# Patient Record
Sex: FEMALE
[De-identification: names, ages, dates, MRNs, and addresses within clinical notes are randomized per-mention and may not be internally consistent; named-entity substitution may affect disease eponyms.]

---
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
## 2022-01-23 ENCOUNTER — OFFICE VISIT (OUTPATIENT)
Dept: URGENT CARE | Facility: URGENT CARE | Age: 22
End: 2022-01-23
Payer: COMMERCIAL

## 2022-01-23 VITALS
RESPIRATION RATE: 18 BRPM | TEMPERATURE: 98.4 F | DIASTOLIC BLOOD PRESSURE: 84 MMHG | HEART RATE: 84 BPM | BODY MASS INDEX: 23.83 KG/M2 | OXYGEN SATURATION: 99 % | SYSTOLIC BLOOD PRESSURE: 130 MMHG | WEIGHT: 134.48 LBS | HEIGHT: 63 IN

## 2022-01-23 DIAGNOSIS — H65.01 NON-RECURRENT ACUTE SEROUS OTITIS MEDIA OF RIGHT EAR: Primary | ICD-10-CM

## 2022-01-23 PROCEDURE — 99213 OFFICE O/P EST LOW 20 MIN: CPT | Performed by: PHYSICIAN ASSISTANT

## 2022-01-23 RX ORDER — PSEUDOEPHEDRINE HCL 30 MG
30 TABLET ORAL EVERY 4 HOURS PRN
Qty: 30 TABLET | Refills: 0 | Status: SHIPPED | OUTPATIENT
Start: 2022-01-23 | End: 2022-02-02

## 2022-01-23 RX ORDER — AZITHROMYCIN 250 MG/1
TABLET, FILM COATED ORAL
Qty: 6 TABLET | Refills: 0 | Status: SHIPPED | OUTPATIENT
Start: 2022-01-23 | End: 2022-02-09 | Stop reason: ALTCHOICE

## 2022-01-23 ASSESSMENT — PAIN SCALES - GENERAL: PAINLEVEL: 4

## 2022-01-24 NOTE — PROGRESS NOTES
"Urgent Care Visit Note    Subjective   Chief Complaint  Chief Complaint   Patient presents with   • Ear Problem     C/o ear infection to right ear       History of Present Illness  Ekaterina Martinez is a 21 y.o. female presenting for complaints of right ear pain.  She notes that she has had an upper respiratory infection for the past couple of weeks and that today she noticed that her right ear was hurting.  She notes pain is constant and sharp in nature.  She denies any left ear pain.  She denies sore throat or fevers.    Review of Systems  Pertinent positives and negatives as per the HPI    History reviewed. No pertinent past medical history.      Current Outpatient Medications:   •  norgestimate-ethinyl estradioL (ORTHO-CYCLEN) 0.25-35 mg-mcg per tablet, Take 1 tablet by mouth daily, Disp: 28 tablet, Rfl: 3  •  pseudoephedrine (Sudafed) 30 mg tablet, Take 1 tablet (30 mg total) by mouth every 4 (four) hours as needed for congestion for up to 10 days, Disp: 30 tablet, Rfl: 0  •  azithromycin (ZITHROMAX) 250 mg tablet, Take 500 mg (2 tablets) PO the first day, then 250 mg (1 tablet) PO daily for 4 days., Disp: 6 tablet, Rfl: 0    Objective   Vital Signs  /84 (BP Location: Right arm, Patient Position: Sitting)   Pulse 84   Temp 36.9 °C (98.4 °F) (Temporal)   Resp 18   Ht 1.6 m (5' 3\")   Wt 61 kg   SpO2 99%   BMI 23.82 kg/m²     Physical Exam  Constitutional:       General: She is not in acute distress.     Appearance: Normal appearance.   HENT:      Head: Normocephalic and atraumatic.      Left Ear: Tympanic membrane and ear canal normal.      Ears:      Comments: Right ear with normal canal.  Bulging and erythema of tympanic membrane     Nose: Nose normal. No congestion or rhinorrhea.      Mouth/Throat:      Mouth: Mucous membranes are moist.      Pharynx: No oropharyngeal exudate.   Eyes:      Extraocular Movements: Extraocular movements intact.      Pupils: Pupils are equal, round, and reactive to " light.   Cardiovascular:      Rate and Rhythm: Normal rate and regular rhythm.      Heart sounds: Normal heart sounds. No murmur heard.  Pulmonary:      Effort: Pulmonary effort is normal. No respiratory distress.      Breath sounds: Normal breath sounds. No wheezing or rhonchi.   Abdominal:      General: Abdomen is flat. Bowel sounds are normal. There is no distension.      Palpations: Abdomen is soft.      Tenderness: There is no abdominal tenderness.   Musculoskeletal:         General: No deformity. Normal range of motion.      Cervical back: Normal range of motion and neck supple. No rigidity.   Skin:     General: Skin is warm and dry.      Capillary Refill: Capillary refill takes less than 2 seconds.      Findings: No lesion.   Neurological:      General: No focal deficit present.      Mental Status: She is alert and oriented to person, place, and time.   Psychiatric:         Mood and Affect: Mood normal.         Lab Review  No results found for this or any previous visit (from the past 12 hour(s)).    Radiology Review  No results found.    Medical Decision Making  Signs and symptoms consistent with otitis media.  Prescription sent for Sudafed as well as azithromycin to the pharmacy.  Azithromycin was chosen as she has a penicillin allergy    Assessment/Plan   See discharge instructions    Patient Education: Ready to learn, no apparent learning barriers were identified; learning preference includes listening.  Explained diagnosis and treatment plan; patient/caregiver expressed understanding of the content. All questions answered.     Patient Instructions   1.  Otitis media, (inner ear infection) right side  - Begin Sudafed as prescribed  - Begin azithromycin as prescribed and finish entire course  - Take Tylenol and ibuprofen as directed for pain and fever  - Monitor for worsening signs and symptoms and return as needed  Patient Education     Otitis Media, Adult    Otitis media is a condition in which the  middle ear is red and swollen (inflamed) and full of fluid. The middle ear is the part of the ear that contains bones for hearing as well as air that helps send sounds to the brain. The condition usually goes away on its own.  What are the causes?  This condition is caused by a blockage in the eustachian tube. The eustachian tube connects the middle ear to the back of the nose. It normally allows air into the middle ear. The blockage is caused by fluid or swelling. Problems that can cause blockage include:  · A cold or infection that affects the nose, mouth, or throat.  · Allergies.  · An irritant, such as tobacco smoke.  · Adenoids that have become large. The adenoids are soft tissue located in the back of the throat, behind the nose and the roof of the mouth.  · Growth or swelling in the upper part of the throat, just behind the nose (nasopharynx).  · Damage to the ear caused by change in pressure. This is called barotrauma.  What are the signs or symptoms?  Symptoms of this condition include:  · Ear pain.  · Fever.  · Problems with hearing.  · Being tired.  · Fluid leaking from the ear.  · Ringing in the ear.  How is this treated?  This condition can go away on its own within 3-5 days. But if the condition is caused by bacteria or does not go away on its own, or if it keeps coming back, your doctor may:  · Give you antibiotic medicines.  · Give you medicines for pain.  Follow these instructions at home:  · Take over-the-counter and prescription medicines only as told by your doctor.  · If you were prescribed an antibiotic medicine, take it as told by your doctor. Do not stop taking the antibiotic even if you start to feel better.  · Keep all follow-up visits as told by your doctor. This is important.  Contact a doctor if:  · You have bleeding from your nose.  · There is a lump on your neck.  · You are not feeling better in 5 days.  · You feel worse instead of better.  Get help right away if:  · You have pain that  is not helped with medicine.  · You have swelling, redness, or pain around your ear.  · You get a stiff neck.  · You cannot move part of your face (paralysis).  · You notice that the bone behind your ear hurts when you touch it.  · You get a very bad headache.  Summary  · Otitis media means that the middle ear is red, swollen, and full of fluid.  · This condition usually goes away on its own.  · If the problem does not go away, treatment may be needed. You may be given medicines to treat the infection or to treat your pain.  · If you were prescribed an antibiotic medicine, take it as told by your doctor. Do not stop taking the antibiotic even if you start to feel better.  · Keep all follow-up visits as told by your doctor. This is important.  This information is not intended to replace advice given to you by your health care provider. Make sure you discuss any questions you have with your health care provider.  Document Revised: 11/19/2020 Document Reviewed: 11/19/2020  [employer] Patient Education © 2021 [employer]           RIMMA Hartley  1/23/2022

## 2022-01-24 NOTE — PATIENT INSTRUCTIONS
1.  Otitis media, (inner ear infection) right side  - Begin Sudafed as prescribed  - Begin azithromycin as prescribed and finish entire course  - Take Tylenol and ibuprofen as directed for pain and fever  - Monitor for worsening signs and symptoms and return as needed  Patient Education     Otitis Media, Adult    Otitis media is a condition in which the middle ear is red and swollen (inflamed) and full of fluid. The middle ear is the part of the ear that contains bones for hearing as well as air that helps send sounds to the brain. The condition usually goes away on its own.  What are the causes?  This condition is caused by a blockage in the eustachian tube. The eustachian tube connects the middle ear to the back of the nose. It normally allows air into the middle ear. The blockage is caused by fluid or swelling. Problems that can cause blockage include:  · A cold or infection that affects the nose, mouth, or throat.  · Allergies.  · An irritant, such as tobacco smoke.  · Adenoids that have become large. The adenoids are soft tissue located in the back of the throat, behind the nose and the roof of the mouth.  · Growth or swelling in the upper part of the throat, just behind the nose (nasopharynx).  · Damage to the ear caused by change in pressure. This is called barotrauma.  What are the signs or symptoms?  Symptoms of this condition include:  · Ear pain.  · Fever.  · Problems with hearing.  · Being tired.  · Fluid leaking from the ear.  · Ringing in the ear.  How is this treated?  This condition can go away on its own within 3-5 days. But if the condition is caused by bacteria or does not go away on its own, or if it keeps coming back, your doctor may:  · Give you antibiotic medicines.  · Give you medicines for pain.  Follow these instructions at home:  · Take over-the-counter and prescription medicines only as told by your doctor.  · If you were prescribed an antibiotic medicine, take it as told by your doctor.  Do not stop taking the antibiotic even if you start to feel better.  · Keep all follow-up visits as told by your doctor. This is important.  Contact a doctor if:  · You have bleeding from your nose.  · There is a lump on your neck.  · You are not feeling better in 5 days.  · You feel worse instead of better.  Get help right away if:  · You have pain that is not helped with medicine.  · You have swelling, redness, or pain around your ear.  · You get a stiff neck.  · You cannot move part of your face (paralysis).  · You notice that the bone behind your ear hurts when you touch it.  · You get a very bad headache.  Summary  · Otitis media means that the middle ear is red, swollen, and full of fluid.  · This condition usually goes away on its own.  · If the problem does not go away, treatment may be needed. You may be given medicines to treat the infection or to treat your pain.  · If you were prescribed an antibiotic medicine, take it as told by your doctor. Do not stop taking the antibiotic even if you start to feel better.  · Keep all follow-up visits as told by your doctor. This is important.  This information is not intended to replace advice given to you by your health care provider. Make sure you discuss any questions you have with your health care provider.  Document Revised: 11/19/2020 Document Reviewed: 11/19/2020  [employer] Patient Education © 2021 [employer]

## 2022-02-09 ENCOUNTER — OFFICE VISIT (OUTPATIENT)
Dept: FAMILY MEDICINE | Facility: CLINIC | Age: 22
End: 2022-02-09
Payer: COMMERCIAL

## 2022-02-09 VITALS
WEIGHT: 129 LBS | SYSTOLIC BLOOD PRESSURE: 102 MMHG | TEMPERATURE: 97.3 F | BODY MASS INDEX: 22.85 KG/M2 | OXYGEN SATURATION: 99 % | RESPIRATION RATE: 18 BRPM | HEART RATE: 94 BPM | DIASTOLIC BLOOD PRESSURE: 58 MMHG

## 2022-02-09 DIAGNOSIS — F41.9 ANXIETY: Primary | ICD-10-CM

## 2022-02-09 DIAGNOSIS — H92.01 EARACHE ON RIGHT: ICD-10-CM

## 2022-02-09 PROCEDURE — 99213 OFFICE O/P EST LOW 20 MIN: CPT | Performed by: PHYSICIAN ASSISTANT

## 2022-02-09 RX ORDER — FLUOXETINE 10 MG/1
10 CAPSULE ORAL DAILY
Qty: 45 CAPSULE | Refills: 0 | Status: SHIPPED | OUTPATIENT
Start: 2022-02-09 | End: 2022-03-16 | Stop reason: ALTCHOICE

## 2022-02-09 ASSESSMENT — PAIN SCALES - GENERAL: PAINLEVEL: 0-NO PAIN

## 2022-02-09 NOTE — PROGRESS NOTES
Subjective      Ekaterina Martinez is a 21 y.o. female who presents for right ear concerns and anxiety. Patient explains that she was on antibiotics for a right ear infection. She started the antibiotic approximately two weeks ago. She missed the last two doses as she left them in [place] when she came here. Reports that she still has some pain intermittently and feels like her lymph nodes on the right are swollen. Denies fever, chills, drainage from ear, muffled hearing, popping or clicking noises, sore throat, cough.     She is also concerned with anxiety. She does have a guardian present with her who also contributes to history. Patient has been dealing with anxiety for years. Guardian reports that the patient seems to have significant mood swing the week before menstruation that affects her daily life. Guardian reports that patient will go to her room and hardly come out. She will also lay around more and seems to be very overwhelmed. After patient starts her period things seems to be ok for about 3 weeks. Symptoms affect school, work and social life. Patient states that it does feel like her heart is beating out of her chest at times and she feels like she cannot shut off her mind sometimes. She denies headaches, dizziness, lightheadedness, chest pain, shortness of breath, nausea, vomiting, diarrhea. Denies suicidal or homicidal ideations. She has never been on birth control before and does not wish to take this. States that her sister was on birth control previously and this seemed to make her anxiety worse.       The following have been reviewed and updated as appropriate in this visit:  Past Medical, Surgical and Social History    Allergies   Allergen Reactions   • Clavulanic Acid      NAUSEA AND VOMITING   • Penicillins    • Amoxicillin Hives     NAUSEA AND VOMITING   • Sulfa (Sulfonamide Antibiotics) Rash     Current Outpatient Medications   Medication Sig Dispense Refill   • FLUoxetine (PROzac) 10 mg  capsule Take 1 capsule (10 mg total) by mouth daily 45 capsule 0     No current facility-administered medications for this visit.       Review of Systems  As noted above in HPI.    Objective   /58 (BP Location: Left arm, Patient Position: Sitting, Cuff Size: Regular Adult)   Pulse 94   Temp 36.3 °C (97.3 °F) (Temporal)   Resp 18   Wt 58.5 kg (129 lb)   LMP  (LMP Unknown)   SpO2 99%   Breastfeeding No   BMI 22.85 kg/m²     Physical Exam  Vitals and nursing note reviewed.   Constitutional:       General: She is not in acute distress.     Appearance: Normal appearance.      Comments: Well groomed, pleasant, cooperative.   HENT:      Head: Normocephalic.      Right Ear: Tympanic membrane, ear canal and external ear normal.      Left Ear: There is impacted cerumen.      Mouth/Throat:      Mouth: Mucous membranes are moist.      Pharynx: No posterior oropharyngeal erythema.   Eyes:      Extraocular Movements: Extraocular movements intact.      Conjunctiva/sclera: Conjunctivae normal.   Cardiovascular:      Rate and Rhythm: Normal rate and regular rhythm.      Heart sounds: Normal heart sounds.   Pulmonary:      Effort: Pulmonary effort is normal.      Breath sounds: Normal breath sounds. No wheezing, rhonchi or rales.   Musculoskeletal:      Cervical back: Normal range of motion and neck supple.   Lymphadenopathy:      Cervical: No cervical adenopathy.   Skin:     General: Skin is warm and dry.   Neurological:      Mental Status: She is alert and oriented to person, place, and time.   Psychiatric:         Attention and Perception: Attention normal.         Mood and Affect: Mood normal.         Speech: Speech normal.         Behavior: Behavior normal.         Thought Content: Thought content normal. Thought content does not include homicidal or suicidal ideation.         Judgment: Judgment normal.         LABS  No results found for this or any previous visit (from the past 2 hour(s)).      Assessment/Plan    Diagnoses and all orders for this visit:    Anxiety  -     FLUoxetine (PROzac) 10 mg capsule; Take 1 capsule (10 mg total) by mouth daily    Earache on right        Reassured the patient that there was no evidence of infection in the right ear on exam today. Likely she has some residual fluid from recent otitis media that should go resolve with time. She may try mucinex and anti-inflammatories as needed for symptoms.     Patient scored 19 on PHQ-9 and 12 on MOLLY-7 indicating scores of moderately severe and moderate respectively. Discussed results with patient. We did discuss starting oral contraceptive versus antidepressant. Patient does not wish to start oral contraceptive and would prefer to try SSRI. Fluoxetine was prescribed. We did start at the lower dose of 10 mg. Discussed that it will take 4-6 weeks for full effects. I would like to have her follow up between 4 & 6 weeks for recheck to determine if she is tolerating the medication and if the dose needs to be adjusted. If any concerns arise sooner recommend contacting the clinic.   Patient expresses understanding and agrees with plan of care.     RIMMA Camacho  February 9, 2022 10:30 AM

## 2022-02-19 ENCOUNTER — OFFICE VISIT (OUTPATIENT)
Dept: URGENT CARE | Facility: URGENT CARE | Age: 22
End: 2022-02-19
Payer: COMMERCIAL

## 2022-02-19 VITALS
DIASTOLIC BLOOD PRESSURE: 73 MMHG | SYSTOLIC BLOOD PRESSURE: 115 MMHG | RESPIRATION RATE: 16 BRPM | WEIGHT: 128.97 LBS | HEART RATE: 74 BPM | OXYGEN SATURATION: 96 % | TEMPERATURE: 98.5 F | BODY MASS INDEX: 22.85 KG/M2

## 2022-02-19 DIAGNOSIS — R07.0 THROAT PAIN: Primary | ICD-10-CM

## 2022-02-19 DIAGNOSIS — I88.9 LYMPHADENITIS: ICD-10-CM

## 2022-02-19 LAB
GP B STREP AG SPEC QL: NEGATIVE
HETEROPH AB SER-ACNC: NEGATIVE

## 2022-02-19 PROCEDURE — 99213 OFFICE O/P EST LOW 20 MIN: CPT | Performed by: FAMILY MEDICINE

## 2022-02-19 PROCEDURE — 86308 HETEROPHILE ANTIBODY SCREEN: CPT | Performed by: FAMILY MEDICINE

## 2022-02-19 PROCEDURE — 36415 COLL VENOUS BLD VENIPUNCTURE: CPT | Performed by: FAMILY MEDICINE

## 2022-02-19 PROCEDURE — 87880 STREP A ASSAY W/OPTIC: CPT | Mod: QW | Performed by: FAMILY MEDICINE

## 2022-02-19 PROCEDURE — 87070 CULTURE OTHR SPECIMN AEROBIC: CPT | Performed by: FAMILY MEDICINE

## 2022-02-19 RX ORDER — CEPHALEXIN 500 MG/1
500 CAPSULE ORAL 4 TIMES DAILY
Qty: 28 CAPSULE | Refills: 0 | Status: SHIPPED | OUTPATIENT
Start: 2022-02-19 | End: 2022-02-26

## 2022-02-19 ASSESSMENT — ENCOUNTER SYMPTOMS
CHILLS: 0
PALPITATIONS: 0
SHORTNESS OF BREATH: 0
EYE PAIN: 0
FEVER: 0
COLOR CHANGE: 0
VOMITING: 0
ARTHRALGIAS: 0
COUGH: 0
SEIZURES: 0
ABDOMINAL PAIN: 0
SORE THROAT: 0
DYSURIA: 0
HEMATURIA: 0
BACK PAIN: 0

## 2022-02-19 ASSESSMENT — PAIN SCALES - GENERAL: PAINLEVEL: 6

## 2022-02-19 NOTE — PROGRESS NOTES
Jaspal Martinez is a 21 y.o. female who presents for C/o sore throat and right ear pain, for the last week or 2, very tired, no muffled hearing    HPI    The following have been reviewed and updated as appropriate in this visit:          Allergies   Allergen Reactions   • Clavulanic Acid      NAUSEA AND VOMITING   • Penicillins    • Amoxicillin Hives     NAUSEA AND VOMITING   • Sulfa (Sulfonamide Antibiotics) Rash     Current Outpatient Medications   Medication Sig Dispense Refill   • FLUoxetine (PROzac) 10 mg capsule Take 1 capsule (10 mg total) by mouth daily 45 capsule 0   • cephalexin (KEFLEX) 500 mg capsule Take 1 capsule (500 mg total) by mouth 4 (four) times a day for 7 days 28 capsule 0     No current facility-administered medications for this visit.     History reviewed. No pertinent past medical history.  Past Surgical History:   Procedure Laterality Date   • TONSILLECTOMY AND ADENOIDECTOMY       Family History   Family history unknown: Yes     Social History     Socioeconomic History   • Marital status: Single   Tobacco Use   • Smoking status: Never Smoker   • Smokeless tobacco: Never Used   Substance and Sexual Activity   • Alcohol use: Never   • Drug use: Never   • Sexual activity: Defer     Social Determinants of Health     Tobacco Use: Low Risk    • Smoking Tobacco Use: Never Smoker   • Smokeless Tobacco Use: Never Used   Depression: At risk   • PHQ-2 Score: 19       Review of Systems   Constitutional: Negative for chills and fever.   HENT: Negative for ear pain and sore throat.    Eyes: Negative for pain and visual disturbance.   Respiratory: Negative for cough and shortness of breath.    Cardiovascular: Negative for chest pain and palpitations.   Gastrointestinal: Negative for abdominal pain and vomiting.   Genitourinary: Negative for dysuria and hematuria.   Musculoskeletal: Negative for arthralgias and back pain.   Skin: Negative for color change and rash.   Neurological: Negative  for seizures and syncope.   All other systems reviewed and are negative.      Objective   /73 (BP Location: Right arm, Patient Position: Sitting, Cuff Size: Regular Adult)   Pulse 74   Temp 36.9 °C (98.5 °F) (Temporal)   Resp 16   Wt 58.5 kg   LMP  (LMP Unknown)   SpO2 96%   BMI 22.85 kg/m²     Physical Exam  Vitals and nursing note reviewed.   Constitutional:       General: She is not in acute distress.     Appearance: Normal appearance.   HENT:      Head: Normocephalic.      Right Ear: Tympanic membrane, ear canal and external ear normal.      Left Ear: There is no impacted cerumen.      Mouth/Throat:      Mouth: Mucous membranes are moist.      Pharynx: No posterior oropharyngeal erythema.   Eyes:      Extraocular Movements: Extraocular movements intact.      Conjunctiva/sclera: Conjunctivae normal.   Cardiovascular:      Rate and Rhythm: Normal rate and regular rhythm.      Heart sounds: Normal heart sounds.   Pulmonary:      Effort: Pulmonary effort is normal.      Breath sounds: Normal breath sounds. No wheezing, rhonchi or rales.   Musculoskeletal:      Cervical back: Normal range of motion and neck supple.   Lymphadenopathy:      Cervical: Cervical adenopathy present.   Skin:     General: Skin is warm and dry.   Neurological:      Mental Status: She is alert and oriented to person, place, and time.   Psychiatric:         Attention and Perception: Attention normal.         Mood and Affect: Mood normal.         Speech: Speech normal.         Behavior: Behavior normal.         Thought Content: Thought content normal. Thought content does not include homicidal or suicidal ideation.         Judgment: Judgment normal.         Assessment/Plan   Diagnoses and all orders for this visit:    Throat pain  -     Rapid Strep Screen Swab  -     Mononucleosis screen Blood, Venous  -     Throat culture    Lymphadenitis  -     cephalexin (KEFLEX) 500 mg capsule; Take 1 capsule (500 mg total) by mouth 4 (four)  times a day for 7 days    She has tenderness along her right anterior cervical chain.  She be treated for lymphadenitis.  Cephalexin is ordered because of her penicillin allergy.Follow-up in 5 to 7 days if her symptoms not resolved.    Wan Norton MD

## 2022-02-21 LAB — BACTERIA THROAT CULT: NORMAL

## 2022-03-16 ENCOUNTER — OFFICE VISIT (OUTPATIENT)
Dept: FAMILY MEDICINE | Facility: CLINIC | Age: 22
End: 2022-03-16
Payer: COMMERCIAL

## 2022-03-16 VITALS
DIASTOLIC BLOOD PRESSURE: 54 MMHG | OXYGEN SATURATION: 98 % | BODY MASS INDEX: 21.93 KG/M2 | WEIGHT: 123.8 LBS | SYSTOLIC BLOOD PRESSURE: 112 MMHG | HEART RATE: 84 BPM | TEMPERATURE: 97.1 F | RESPIRATION RATE: 18 BRPM

## 2022-03-16 DIAGNOSIS — F41.9 ANXIETY: Primary | ICD-10-CM

## 2022-03-16 PROCEDURE — 99213 OFFICE O/P EST LOW 20 MIN: CPT | Performed by: PHYSICIAN ASSISTANT

## 2022-03-16 RX ORDER — FLUOXETINE HYDROCHLORIDE 20 MG/1
20 CAPSULE ORAL DAILY
Qty: 45 CAPSULE | Refills: 0 | Status: SHIPPED | OUTPATIENT
Start: 2022-03-16 | End: 2022-04-20 | Stop reason: ALTCHOICE

## 2022-03-16 ASSESSMENT — PAIN SCALES - GENERAL: PAINLEVEL: 0-NO PAIN

## 2022-03-16 NOTE — PROGRESS NOTES
Subjective      Ekaterina Martinez is a 21 y.o. female who presents for follow up of anxiety and depression. Patient was started on Prozac over one month ago. She states that she feels there has been some improvement, however the week after her period she feels she still has severe symptoms. Patient reports that she burst into tears at work for no real reason once the week after her period. She did call out from work two days ago as she still wasn't feeling up to going to work. She does still feel she has trouble controlling her worrying and worries about numerous things. She does feel she has a little interest and pleasure in doing things particularly the week after menstruation. She feels fatigued and has troubles with sleep. Denies homicidal or suicidal ideation. She denies any concerns with side effects with the prozac. Denies headache, dizziness, changes in appetite, nausea, vomiting, diarrhea, abdominal pain, urinary symptoms.       The following have been reviewed and updated as appropriate in this visit:  Past Medical, Surgical and Social History    Allergies   Allergen Reactions   • Clavulanic Acid      NAUSEA AND VOMITING   • Penicillins    • Amoxicillin Hives     NAUSEA AND VOMITING   • Sulfa (Sulfonamide Antibiotics) Rash     Current Outpatient Medications   Medication Sig Dispense Refill   • FLUoxetine (PROzac) 20 mg capsule Take 1 capsule (20 mg total) by mouth daily 45 capsule 0     No current facility-administered medications for this visit.       Review of Systems  As noted above in HPI.    Objective   /54 (BP Location: Left arm, Patient Position: Sitting, Cuff Size: Regular Adult)   Pulse 84   Temp 36.2 °C (97.1 °F) (Temporal)   Resp 18   Wt 56.2 kg (123 lb 12.8 oz)   SpO2 98%   BMI 21.93 kg/m²     Physical Exam  Vitals and nursing note reviewed.   Constitutional:       General: She is not in acute distress.     Appearance: Normal appearance.   HENT:      Head: Normocephalic.    Cardiovascular:      Rate and Rhythm: Normal rate and regular rhythm.      Heart sounds: Normal heart sounds.   Pulmonary:      Effort: Pulmonary effort is normal.      Breath sounds: Normal breath sounds. No wheezing, rhonchi or rales.   Skin:     General: Skin is warm and dry.   Neurological:      Mental Status: She is alert and oriented to person, place, and time.   Psychiatric:         Attention and Perception: Attention normal.         Mood and Affect: Affect normal.         Speech: Speech normal.         Behavior: Behavior is cooperative.         Thought Content: Thought content normal.         Cognition and Memory: Cognition normal.         Judgment: Judgment normal.         LABS  No results found for this or any previous visit (from the past 2 hour(s)).      Assessment/Plan   Diagnoses and all orders for this visit:    Anxiety  -     FLUoxetine (PROzac) 20 mg capsule; Take 1 capsule (20 mg total) by mouth daily        Patient did have improvement in both her PHQ-9 and MOLLY-7 scores.  She does report she is still having some significant symptoms.  We will go ahead and increase her dose of fluoxetine to 20 mg daily.  I would like patient to return to clinic in 6 weeks for reevaluation to determine if there has been any improvements in her symptoms.  She was advised to contact the clinic sooner if she develops any acute concerns regarding medication or with her wellbeing.  Patient expresses understanding and agrees with plan of care.     RIMMA Camacho  March 16, 2022 9:52 AM

## 2022-03-24 ENCOUNTER — OFFICE VISIT (OUTPATIENT)
Dept: FAMILY MEDICINE | Facility: CLINIC | Age: 22
End: 2022-03-24
Payer: COMMERCIAL

## 2022-03-24 ENCOUNTER — APPOINTMENT (OUTPATIENT)
Dept: LAB | Facility: URGENT CARE | Age: 22
End: 2022-03-24
Payer: COMMERCIAL

## 2022-03-24 VITALS
SYSTOLIC BLOOD PRESSURE: 122 MMHG | BODY MASS INDEX: 21.79 KG/M2 | OXYGEN SATURATION: 98 % | RESPIRATION RATE: 18 BRPM | DIASTOLIC BLOOD PRESSURE: 66 MMHG | HEART RATE: 80 BPM | TEMPERATURE: 97.7 F | WEIGHT: 123 LBS

## 2022-03-24 DIAGNOSIS — R30.0 DYSURIA: Primary | ICD-10-CM

## 2022-03-24 LAB
BACTERIA #/AREA URNS AUTO: NORMAL /HPF
BILIRUB UR QL: NEGATIVE
CLARITY UR: CLEAR
COLOR UR: YELLOW
GLUCOSE UR QL: NEGATIVE MG/DL
HGB UR QL: NEGATIVE
KETONES UR-MCNC: NEGATIVE MG/DL
LEUKOCYTE ESTERASE UR QL STRIP: NEGATIVE
NITRITE UR QL: NEGATIVE
PH UR: 6 PH
PROT UR STRIP-MCNC: NEGATIVE MG/DL
RBC #/AREA URNS AUTO: NEGATIVE /HPF
SP GR UR: 1.01 (ref 1–1.03)
SQUAMOUS #/AREA URNS AUTO: NEGATIVE /HPF
UROBILINOGEN UR-MCNC: 0.2 E.U./DL
WBC #/AREA URNS AUTO: NEGATIVE /HPF

## 2022-03-24 PROCEDURE — 99213 OFFICE O/P EST LOW 20 MIN: CPT | Performed by: PHYSICIAN ASSISTANT

## 2022-03-24 PROCEDURE — 87088 URINE BACTERIA CULTURE: CPT | Performed by: PHYSICIAN ASSISTANT

## 2022-03-24 PROCEDURE — 81001 URINALYSIS AUTO W/SCOPE: CPT | Performed by: PHYSICIAN ASSISTANT

## 2022-03-24 ASSESSMENT — PAIN SCALES - GENERAL: PAINLEVEL: 3

## 2022-03-24 NOTE — PROGRESS NOTES
Subjective      HPI    Ekaterina Martinez is a 21 y.o. female who presents for increased urinary frequency and some dysuria.  Symptoms started yesterday.  She has increased her fluids with minimal relief.  She denies any fevers, chills, nausea, vomiting, abd pain, back or flank pain, vaginal symptoms, change in bowel habits. She has no history of UTIs.      Review of Systems    As noted in HPI.      Objective   /66 (BP Location: Right arm, Patient Position: Sitting, Cuff Size: Regular Adult)   Pulse 80   Temp 36.5 °C (97.7 °F) (Temporal)   Resp 18   Wt 55.8 kg (123 lb)   SpO2 98%   BMI 21.79 kg/m²     Physical Exam  Vitals and nursing note reviewed.   Constitutional:       General: She is not in acute distress.     Appearance: Normal appearance.   HENT:      Head: Normocephalic.   Cardiovascular:      Rate and Rhythm: Normal rate and regular rhythm.      Heart sounds: Normal heart sounds.   Pulmonary:      Effort: Pulmonary effort is normal.      Breath sounds: Normal breath sounds. No wheezing, rhonchi or rales.   Abdominal:      General: Bowel sounds are normal.      Palpations: Abdomen is soft.      Tenderness: There is no abdominal tenderness. There is no right CVA tenderness, left CVA tenderness, guarding or rebound.   Skin:     General: Skin is warm and dry.   Neurological:      Mental Status: She is alert and oriented to person, place, and time.         Recent Results (from the past 4 hour(s))   Urinalysis, dipstick Urine, Clean Catch    Collection Time: 03/24/22  2:24 PM   Result Value Ref Range    Color, Urine Yellow Yellow    Clarity, Urine Clear Clear    Specific Gravity, Urine 1.010 1.003 - 1.030    Leukocytes, Urine Negative Negative    Nitrite, Urine Negative Negative    Protein, Urine Negative Negative mg/dL    Ketones, Urine Negative Negative mg/dL    Urobilinogen, Urine 0.2 <2.0 E.U./dL    Bilirubin, Urine Negative Negative    Blood, Urine Negative Negative    Glucose, Urine Negative  Negative mg/dL    pH, Urine 6.0 5.0 - 8.0 PH         Assessment/Plan   Diagnoses and all orders for this visit:    Dysuria  -     Urinalysis, dipstick Urine, Clean Catch  -     Urine culture  -     Urinalysis, microscopic Urine, Clean Catch        Discussion:   Given her symptoms and physical exam, a UA dip was ordered.  UA dip results were discussed with patient as above.  Will culture her urine and call her with results.  If results indicate UTI will call prescription in for the patient.  Patient will continue to increase her fluid intake and use Tylenol, ibuprofen, and Azo as needed for pain.  Expected course of this diagnosis discussed with pt. Pt will f/u in clinic prn persisting or worsening symptoms.  Pt verbalizes understanding and agrees with plan.       RIMMA Camacho

## 2022-03-26 LAB — BACTERIA UR CULT: NORMAL

## 2022-04-20 ENCOUNTER — OFFICE VISIT (OUTPATIENT)
Dept: FAMILY MEDICINE | Facility: CLINIC | Age: 22
End: 2022-04-20
Payer: COMMERCIAL

## 2022-04-20 VITALS
TEMPERATURE: 98.4 F | DIASTOLIC BLOOD PRESSURE: 58 MMHG | HEART RATE: 92 BPM | RESPIRATION RATE: 18 BRPM | WEIGHT: 125.9 LBS | BODY MASS INDEX: 22.3 KG/M2 | OXYGEN SATURATION: 98 % | SYSTOLIC BLOOD PRESSURE: 114 MMHG

## 2022-04-20 DIAGNOSIS — F41.9 ANXIETY: Primary | ICD-10-CM

## 2022-04-20 PROCEDURE — 99213 OFFICE O/P EST LOW 20 MIN: CPT | Performed by: PHYSICIAN ASSISTANT

## 2022-04-20 RX ORDER — FLUOXETINE HYDROCHLORIDE 40 MG/1
40 CAPSULE ORAL DAILY
Qty: 90 CAPSULE | Refills: 3 | Status: SHIPPED | OUTPATIENT
Start: 2022-04-20 | End: 2023-04-20

## 2022-04-20 ASSESSMENT — PAIN SCALES - GENERAL: PAINLEVEL: 0-NO PAIN

## 2022-04-20 NOTE — PROGRESS NOTES
"Subjective      Ekaterina Martinez is a 21 y.o. female who presents for a follow up regarding anxiety. Patient's fluoxetine was increased to 20 mg daily at previous visit. She reports today that she feel that this higher dose is working better. She feels improvement in her anxiety and over all well being. States that she still feels like the \"highs are really high and lows are really low\" however. She denies any side effects from the medication including headache, insomnia, nausea, vomiting, palpitations, abdominal pain, diarrhea, suicidal or homicidal ideation.      The following have been reviewed and updated as appropriate in this visit:  Past Medical, Surgical and Social History    Allergies   Allergen Reactions   • Clavulanic Acid      NAUSEA AND VOMITING   • Penicillins    • Amoxicillin Hives     NAUSEA AND VOMITING   • Sulfa (Sulfonamide Antibiotics) Rash     Current Outpatient Medications   Medication Sig Dispense Refill   • FLUoxetine (PROzac) 40 mg capsule Take 1 capsule (40 mg total) by mouth daily 90 capsule 3     No current facility-administered medications for this visit.       Review of Systems  As noted above in HPI.    Objective   /58 (BP Location: Left arm, Patient Position: Sitting, Cuff Size: Regular Adult)   Pulse 92   Temp 36.9 °C (98.4 °F) (Temporal)   Resp 18   Wt 57.1 kg (125 lb 14.4 oz)   SpO2 98%   BMI 22.30 kg/m²     Physical Exam  Vitals and nursing note reviewed.   Constitutional:       General: She is not in acute distress.     Appearance: Normal appearance.   HENT:      Head: Normocephalic.   Cardiovascular:      Rate and Rhythm: Normal rate and regular rhythm.      Heart sounds: Normal heart sounds.   Pulmonary:      Effort: Pulmonary effort is normal.      Breath sounds: Normal breath sounds.   Skin:     General: Skin is warm and dry.   Neurological:      Mental Status: She is alert and oriented to person, place, and time.         LABS  No results found for this or any " previous visit (from the past 2 hour(s)).      Assessment/Plan   Diagnoses and all orders for this visit:    Anxiety  -     FLUoxetine (PROzac) 40 mg capsule; Take 1 capsule (40 mg total) by mouth daily        Patient has had improvement on her PHQ-9 (14 previously and now 9). MOLLY-7 score remains the same. She is not having any significant side effects from her medication. Discussed staying at current dose versus increasing one more time to see if there may be better results at the higher dose. After discussion, patient would like to proceed with increased dose. New prescription was sent to the pharmacy for the patient. She does still have 10 days worth of 20 mg tabs left. She may take two daily of these to complete that prescription and then start the new prescription thereafter. If this new dose seems to improve her symptoms even more and she is happy with the dose and not having any side effects she may call the clinic and let us know that so we can continue to prescribe this dose. However, if she still feels she is having significant symptoms or she has concerning side effects with the new dose then she should return to clinic in 4-6 weeks for follow up.   Patient expresses understanding and agrees with plan of care.     RIMMA Camacho  April 20, 2022 9:59 AM

## 2022-05-26 ENCOUNTER — CLINICAL SUPPORT (OUTPATIENT)
Dept: FAMILY MEDICINE | Facility: CLINIC | Age: 22
End: 2022-05-26
Payer: COMMERCIAL

## 2022-05-26 DIAGNOSIS — Z23 IMMUNIZATION DUE: Primary | ICD-10-CM

## 2022-05-26 PROCEDURE — 90472 IMMUNIZATION ADMIN EACH ADD: CPT | Performed by: NURSE PRACTITIONER

## 2022-05-26 PROCEDURE — 90471 IMMUNIZATION ADMIN: CPT | Performed by: NURSE PRACTITIONER

## 2022-05-26 PROCEDURE — 90734 MENACWYD/MENACWYCRM VACC IM: CPT | Performed by: NURSE PRACTITIONER

## 2022-05-26 PROCEDURE — 90715 TDAP VACCINE 7 YRS/> IM: CPT | Performed by: NURSE PRACTITIONER

## 2022-08-11 ENCOUNTER — APPOINTMENT (OUTPATIENT)
Dept: EMPLOYEE HEALTH | Facility: CLINIC | Age: 22
End: 2022-08-11
Payer: COMMERCIAL

## 2022-08-11 DIAGNOSIS — Z02.1 PRE-EMPLOYMENT HEALTH SCREENING EXAMINATION: Primary | ICD-10-CM

## 2022-08-11 LAB — HBV SURFACE AB SER QL: 4 MIU/ML

## 2022-08-11 PROCEDURE — 86481 TB AG RESPONSE T-CELL SUSP: CPT | Mod: EHNH

## 2022-08-11 PROCEDURE — 86706 HEP B SURFACE ANTIBODY: CPT | Mod: EHNH

## 2022-08-12 LAB
M TB TUBERC IFN-G BLD QL: NEGATIVE
TBGOLD MITO: 10 IU/ML
TBGOLD NIL: 0 IU/ML
TBGOLD TB1-NIL: 0.01 IU/ML
TBGOLD TB2-NIL: 0.01 IU/ML